# Patient Record
Sex: MALE | URBAN - METROPOLITAN AREA
[De-identification: names, ages, dates, MRNs, and addresses within clinical notes are randomized per-mention and may not be internally consistent; named-entity substitution may affect disease eponyms.]

---

## 2018-09-17 ENCOUNTER — TELEPHONE (OUTPATIENT)
Dept: FAMILY MEDICINE | Facility: CLINIC | Age: 38
End: 2018-09-17

## 2018-09-17 ENCOUNTER — OFFICE VISIT (OUTPATIENT)
Dept: FAMILY MEDICINE | Facility: CLINIC | Age: 38
End: 2018-09-17
Payer: COMMERCIAL

## 2018-09-17 VITALS
SYSTOLIC BLOOD PRESSURE: 131 MMHG | OXYGEN SATURATION: 97 % | DIASTOLIC BLOOD PRESSURE: 82 MMHG | TEMPERATURE: 98.3 F | HEART RATE: 81 BPM | RESPIRATION RATE: 16 BRPM | BODY MASS INDEX: 30.25 KG/M2 | WEIGHT: 188.2 LBS | HEIGHT: 66 IN

## 2018-09-17 DIAGNOSIS — Z91.030 BEE STING ALLERGY: ICD-10-CM

## 2018-09-17 DIAGNOSIS — F41.9 ANXIETY DISORDER, UNSPECIFIED TYPE: ICD-10-CM

## 2018-09-17 DIAGNOSIS — Z00.00 ROUTINE GENERAL MEDICAL EXAMINATION AT A HEALTH CARE FACILITY: Primary | ICD-10-CM

## 2018-09-17 RX ORDER — EPINEPHRINE 0.3 MG/.3ML
0.3 INJECTION SUBCUTANEOUS PRN
Qty: 0.6 ML | Refills: 0 | Status: SHIPPED | OUTPATIENT
Start: 2018-09-17

## 2018-09-17 ASSESSMENT — ANXIETY QUESTIONNAIRES
IF YOU CHECKED OFF ANY PROBLEMS ON THIS QUESTIONNAIRE, HOW DIFFICULT HAVE THESE PROBLEMS MADE IT FOR YOU TO DO YOUR WORK, TAKE CARE OF THINGS AT HOME, OR GET ALONG WITH OTHER PEOPLE: SOMEWHAT DIFFICULT
7. FEELING AFRAID AS IF SOMETHING AWFUL MIGHT HAPPEN: NOT AT ALL
1. FEELING NERVOUS, ANXIOUS, OR ON EDGE: MORE THAN HALF THE DAYS
6. BECOMING EASILY ANNOYED OR IRRITABLE: NOT AT ALL
5. BEING SO RESTLESS THAT IT IS HARD TO SIT STILL: SEVERAL DAYS
3. WORRYING TOO MUCH ABOUT DIFFERENT THINGS: MORE THAN HALF THE DAYS
GAD7 TOTAL SCORE: 7
2. NOT BEING ABLE TO STOP OR CONTROL WORRYING: SEVERAL DAYS

## 2018-09-17 ASSESSMENT — PATIENT HEALTH QUESTIONNAIRE - PHQ9: 5. POOR APPETITE OR OVEREATING: SEVERAL DAYS

## 2018-09-17 NOTE — PROGRESS NOTES
Male Physical Note          HPI         Concerns today: Anxiety    Patient presents today describing symptoms compatible with generalized anxiety. He/She confirms experiencing the following symptoms:Excessive worry, Tension, edginess, and irritability, Rapid heartbeat (palpitations), Shakes, chills, and hot flashes, Headaches and Muscle aches and back pain.  These symptoms are interfering with his/her ability to function.  Problems like this have been present in the past.    Patient current in treatment for substance abuse.  Finds the symptoms the worst when getting into group treatment, but are present all the other times.    Patient Active Problem List   Diagnosis     Anxiety disorder     Bee sting allergy       Past Medical History:   Diagnosis Date     Anxiety disorder      Bee sting allergy        Previous Medical Care   [facility]     Family History   Problem Relation Age of Onset     Diabetes Mother      Hypertension Father      Depression Father      Substance Abuse Father      Coronary Artery Disease No family hx of      Hyperlipidemia No family hx of      Cerebrovascular Disease No family hx of      Breast Cancer No family hx of      Colon Cancer No family hx of      Other Cancer No family hx of      Prostate Cancer No family hx of      Anxiety Disorder No family hx of      Mental Illness No family hx of      Anesthesia Reaction No family hx of      Asthma No family hx of      Osteoporosis No family hx of      Genetic Disorder No family hx of      Thyroid Disease No family hx of      Obesity No family hx of      Unknown/Adopted No family hx of               Review of Systems:     Review of Systems:  CONSTITUTIONAL: NEGATIVE for fever, chills, change in weight  INTEGUMENTARY/SKIN: NEGATIVE for worrisome rashes, moles or lesions  EYES: NEGATIVE for vision changes or irritation  ENT/MOUTH: NEGATIVE for ear, mouth and throat problems  RESP: NEGATIVE for significant cough or SOB  BREAST: NEGATIVE for masses,  "tenderness or discharge  CV: NEGATIVE for chest pain, palpitations or peripheral edema  GI: NEGATIVE for nausea, abdominal pain, heartburn, or change in bowel habits  : NEGATIVE for frequency, dysuria, or hematuria  MUSCULOSKELETAL: NEGATIVE for significant arthralgias or myalgia  NEURO: NEGATIVE for weakness, dizziness or paresthesias  ENDOCRINE: NEGATIVE for temperature intolerance, skin/hair changes  HEME/ALLERGY: NEGATIVE for bleeding problems  PSYCHIATRIC: NEGATIVE for changes in mood or affect  Sleep:   Do you snore most or the night (as reported by a family member)? No  Do you feel sleepy or extremely tired during most of the day? No             Social History     Social History     Social History     Marital status: Single     Spouse name: N/A     Number of children: 2     Years of education: N/A     Occupational History     Not on file.     Social History Main Topics     Smoking status: Current Every Day Smoker     Types: Cigarettes     Smokeless tobacco: Never Used     Alcohol use No      Comment: Previous abuse     Drug use: No      Comment: Previous meth and heroine - tried and did not persue it     Sexual activity: Not Currently     Partners: Female     Birth control/ protection: None     Other Topics Concern     Not on file     Social History Narrative     No narrative on file       Marital Status:Single  Who lives in your household? Myself, In treatment  Has anyone hurt you physically, for example by pushing, hitting, slapping or kicking you or forcing you to have sex? Denies  Do you feel threatened or controlled by a partner, ex-partner or anyone in your life? Denies    Sexual Health     Sexual concerns: No   STI History: Neg      Recommended Screening     Cholesterol Level (>44 yo or at risk):  Testing not indicated  HIV screening:  Recommended and patient declined testing.             Physical Exam:     Vitals: /82  Pulse 81  Temp 98.3  F (36.8  C) (Oral)  Resp 16  Ht 5' 6\" (167.6 cm)  " Wt 188 lb 3.2 oz (85.4 kg)  SpO2 97%  BMI 30.38 kg/m2  BMI= Body mass index is 30.38 kg/(m^2).  GENERAL: healthy, alert and no distress  EYES: Eyes grossly normal to inspection, extraocular movements - intact, and PERRL  HENT: ear canals- normal; TMs- normal; Nose- normal; Mouth- no ulcers, no lesions  NECK: no tenderness, no adenopathy, no asymmetry, no masses, no stiffness; thyroid- normal to palpation  RESP: lungs clear to auscultation - no rales, no rhonchi, no wheezes  BREAST: no masses, no tenderness, no nipple discharge, no palpable axillary masses or adenopathy  CV: regular rates and rhythm, normal S1 S2, no S3 or S4 and no murmur, no click or rub -  ABDOMEN: soft, no tenderness, no  hepatosplenomegaly, no masses, normal bowel sounds  MS: extremities- no gross deformities noted, no edema  SKIN: no suspicious lesions, no rashes  NEURO: strength and tone- normal, sensory exam- grossly normal, mentation- intact, speech- normal, reflexes- symmetric  BACK: no CVA tenderness, no paralumbar tenderness  - male: testicles- normal, no atrophy, no masses;  no inguinal hernias  RECTAL- male: no masses, no hemorhoids, Prostate- symmetric, no  nodularity, no masses, no hypertrophy  PSYCH: Alert and oriented times 3; speech- coherent , normal rate and volume; able to articulate logical thoughts, able to abstract reason, no tangential thoughts, no hallucinations or delusions, affect- normal  LYMPHATICS: ant. cervical- normal, post. cervical- normal, axillary- normal, supraclavicular- normal, inguinal- normal    Assessment and Plan      Alfredo was seen today for new patient and mental health problem.    Diagnoses and all orders for this visit:    Routine general medical examination at a health care facility  Patient reports health maintenance up to date.  Declines TDAP today despite no records of immunization.  Declines HIV testing.    Anxiety disorder, unspecified type  -     Start sertraline (ZOLOFT) 50 MG tablet;  Take 1 tablet (50 mg) by mouth daily  -     BEHAVIORAL HEALTH REFERRAL ([facility] interal and external).  Patient seen by behavioral health today.  -      Return to clinic in 4-6 weeks for recheck.    Bee sting allergy  -     Refill EPINEPHrine (EPIPEN/ADRENACLICK/OR ANY BX GENERIC EQUIV) 0.3 MG/0.3ML injection 2-pack; Inject 0.3 mLs (0.3 mg) into the muscle as needed for anaphylaxis      Options for treatment and follow-up care were reviewed with the patient. Alfredo Melgar and/or guardian engaged in the decision making process and verbalized understanding of the options discussed and agreed with the final plan.    Konrad Garzon MD

## 2018-09-17 NOTE — MR AVS SNAPSHOT
After Visit Summary   9/17/2018    Alfredo Melgar    MRN: [id number]           Patient Information     Date Of Birth          1980        Visit Information        Provider Department      9/17/2018 1:40 PM Konrad Garzon MD [facility]        Today's Diagnoses     Routine general medical examination at a health care facility    -  1    Anxiety disorder, unspecified type        Bee sting allergy          Care Instructions    Here is the plan from today's visit    Mental Health Appointment scheduled with Dr. Helm on Wednesday 9/26 at 1:20 PM    1. Routine general medical examination at a health care facility  Preventive Health Recommendations  Male Ages 26 - 39    Yearly exam:             See your health care provider every year in order to  o   Review health changes.   o   Discuss preventive care.    o   Review your medicines if your doctor has prescribed any.    You should be tested each year for STDs (sexually transmitted diseases), if you re at risk.     After age 35, talk to your provider about cholesterol testing. If you are at risk for heart disease, have your cholesterol tested at least every 5 years.     If you are at risk for diabetes, you should have a diabetes test (fasting glucose).  Shots: Get a flu shot each year. Get a tetanus shot every 10 years.     Nutrition:    Eat at least 5 servings of fruits and vegetables daily.     Eat whole-grain bread, whole-wheat pasta and brown rice instead of white grains and rice.     Get adequate Calcium and Vitamin D.     Lifestyle    Exercise for at least 150 minutes a week (30 minutes a day, 5 days a week). This will help you control your weight and prevent disease.     Limit alcohol to one drink per day.     No smoking.     Wear sunscreen to prevent skin cancer.     See your dentist every six months for an exam and cleaning.       2. Anxiety disorder, unspecified type  Start the medication as below  - sertraline (ZOLOFT)  50 MG tablet; Take 1 tablet (50 mg) by mouth daily  Dispense: 30 tablet; Refill: 1  - BEHAVIORAL HEALTH REFERRAL ([facility] interal and external)    3. Bee sting allergy  - EPINEPHrine (EPIPEN/ADRENACLICK/OR ANY BX GENERIC EQUIV) 0.3 MG/0.3ML injection 2-pack; Inject 0.3 mLs (0.3 mg) into the muscle as needed for anaphylaxis  Dispense: 0.6 mL; Refill: 0    Follow up plan  Please make a clinic appointment for follow up with me (JANIE GARZON) in 4-6  weeks for recheck anxiety.    Thank you for coming to [facility] today.  Lab Testing:  **If you had lab testing today and your results are reassuring or normal they will be mailed to you or sent through [facility] [employer] within 7 days.   **If the lab tests need quick action we will call you with the results.  The phone number we will call with results is # [phone] (home) . If this is not the best number please call our clinic and change the number.  Medication Refills:  If you need any refills please call your pharmacy and they will contact us.   If you need to  your refill at a new pharmacy, please contact the new pharmacy directly. The new pharmacy will help you get your medications transferred faster.   Scheduling:  If you have any concerns about today's visit or wish to schedule another appointment please call our office during normal business hours [phone] (8-5:00 M-F)  If a referral was made to a [facility] and you don't get a call from central scheduling please call [phone].  If a Mammogram was ordered for you at [facility] call [phone] to schedule or change your appointment.  If you had an XRay/CT/Ultrasound/MRI ordered the number is [phone] to schedule or change your radiology appointment.   Medical Concerns:  If you have urgent medical concerns please call [phone] at any time of the day.    Janie Garzon MD               Follow-ups after your visit        Additional Services     BEHAVIORAL  "HEALTH REFERRAL ([facility] interal and external)       Referring MD: JANIE GARZON    May leave message on voicemail: Yes  PHQ-9 Score: 3  GAD7 Score: 7                  Who to contact     Please call your clinic at [phone] to:    Ask questions about your health    Make or cancel appointments    Discuss your medicines    Learn about your test results    Speak to your doctor            Additional Information About Your Visit        Care EveryWhere ID     This is your Care EveryWhere ID. This could be used by other organizations to access your [facility] medical records  [id number]        Your Vitals Were     Pulse Temperature Respirations Height Pulse Oximetry BMI (Body Mass Index)    81 98.3  F (36.8  C) (Oral) 16 5' 6\" (167.6 cm) 97% 30.38 kg/m2       Blood Pressure from Last 3 Encounters:   09/17/18 131/82    Weight from Last 3 Encounters:   09/17/18 188 lb 3.2 oz (85.4 kg)              We Performed the Following     BEHAVIORAL HEALTH REFERRAL ([facility] inter[facility] and external)          Today's Medication Changes          These changes are accurate as of 9/17/18  2:41 PM.  If you have any questions, ask your nurse or doctor.               Start taking these medicines.        Dose/Directions    EPINEPHrine 0.3 MG/0.3ML injection 2-pack   Commonly known as:  EPIPEN/ADRENACLICK/or ANY BX GENERIC EQUIV   Used for:  Bee sting allergy   Started by:  Janie Garzon MD        Dose:  0.3 mg   Inject 0.3 mLs (0.3 mg) into the muscle as needed for anaphylaxis   Quantity:  0.6 mL   Refills:  0       sertraline 50 MG tablet   Commonly known as:  ZOLOFT   Used for:  Anxiety disorder, unspecified type   Started by:  Janie Garzon MD        Dose:  50 mg   Take 1 tablet (50 mg) by mouth daily   Quantity:  30 tablet   Refills:  1            Where to get your medicines      These medications were sent to [facility] [place] [facility] - [place], [place] - [address]  [address], [facility], [place] [address] "     Phone:  [phone]     EPINEPHrine 0.3 MG/0.3ML injection 2-pack    sertraline 50 MG tablet                Primary Care Provider Fax #    Physician No Ref-Primary [phone]       No address on file        Equal Access to Services     VAIBHAVRENETTA KATHY : Elfego arik whittington geovany Prince, washareeda luqadaha, qaybta kaalmada adeautumn, abena aguilar laPapivane warren. So [facility] [phone].    ATENCIÓN: Si habla español, tiene a pedro disposición servicios gratuitos de asistencia lingüística. Llame al [phone].    We comply with applicable federal civil rights laws and [place] laws. We do not discriminate on the basis of race, color, national origin, age, disability, sex, sexual orientation, or gender identity.            Thank you!     Thank you for choosing [facility]  for your care. Our goal is always to provide you with excellent care. Hearing back from our patients is one way we can continue to improve our services. Please take a few minutes to complete the written survey that you may receive in the mail after your visit with us. Thank you!             Your Updated Medication List - Protect others around you: Learn how to safely use, store and throw away your medicines at www.disposemymeds.org.          This list is accurate as of 9/17/18  2:41 PM.  Always use your most recent med list.                   Brand Name Dispense Instructions for use Diagnosis    EPINEPHrine 0.3 MG/0.3ML injection 2-pack    EPIPEN/ADRENACLICK/or ANY BX GENERIC EQUIV    0.6 mL    Inject 0.3 mLs (0.3 mg) into the muscle as needed for anaphylaxis    Bee sting allergy       sertraline 50 MG tablet    ZOLOFT    30 tablet    Take 1 tablet (50 mg) by mouth daily    Anxiety disorder, unspecified type

## 2018-09-17 NOTE — PROGRESS NOTES
Consult:  Was asked by Dr. Garzon to assist with getting this patient scheduled today for therapy for anxiety.  Patient is at [facility] and it can be difficult to reach patients when they are there.  Alfredo is scheduled with Dr. Helm for 9/20 at 1:20 PM.

## 2018-09-17 NOTE — TELEPHONE ENCOUNTER
Mental Health Referral:  Please schedule with Dr. Helm.      If you are unable to reach the patient after two phone attempts, please send a letter and close the encounter.      Thank you!

## 2018-09-17 NOTE — PATIENT INSTRUCTIONS
Here is the plan from today's visit    Mental Health Appointment scheduled with Dr. Helm on Wednesday 9/26 at 1:20 PM    1. Routine general medical examination at a health care facility  Preventive Health Recommendations  Male Ages 26 - 39    Yearly exam:             See your health care provider every year in order to  o   Review health changes.   o   Discuss preventive care.    o   Review your medicines if your doctor has prescribed any.    You should be tested each year for STDs (sexually transmitted diseases), if you re at risk.     After age 35, talk to your provider about cholesterol testing. If you are at risk for heart disease, have your cholesterol tested at least every 5 years.     If you are at risk for diabetes, you should have a diabetes test (fasting glucose).  Shots: Get a flu shot each year. Get a tetanus shot every 10 years.     Nutrition:    Eat at least 5 servings of fruits and vegetables daily.     Eat whole-grain bread, whole-wheat pasta and brown rice instead of white grains and rice.     Get adequate Calcium and Vitamin D.     Lifestyle    Exercise for at least 150 minutes a week (30 minutes a day, 5 days a week). This will help you control your weight and prevent disease.     Limit alcohol to one drink per day.     No smoking.     Wear sunscreen to prevent skin cancer.     See your dentist every six months for an exam and cleaning.       2. Anxiety disorder, unspecified type  Start the medication as below  - sertraline (ZOLOFT) 50 MG tablet; Take 1 tablet (50 mg) by mouth daily  Dispense: 30 tablet; Refill: 1  - BEHAVIORAL HEALTH REFERRAL ([facility] interal and external)    3. Bee sting allergy  - EPINEPHrine (EPIPEN/ADRENACLICK/OR ANY BX GENERIC EQUIV) 0.3 MG/0.3ML injection 2-pack; Inject 0.3 mLs (0.3 mg) into the muscle as needed for anaphylaxis  Dispense: 0.6 mL; Refill: 0    Follow up plan  Please make a clinic appointment for follow up with me (JANIE FERNANDO) in 4-6  weeks for  recheck anxiety.    Thank you for coming to [facility] today.  Lab Testing:  **If you had lab testing today and your results are reassuring or normal they will be mailed to you or sent through [employer] within 7 days.   **If the lab tests need quick action we will call you with the results.  The phone number we will call with results is # [phone] (home) . If this is not the best number please call our clinic and change the number.  Medication Refills:  If you need any refills please call your pharmacy and they will contact us.   If you need to  your refill at a new pharmacy, please contact the new pharmacy directly. The new pharmacy will help you get your medications transferred faster.   Scheduling:  If you have any concerns about today's visit or wish to schedule another appointment please call our office during normal business hours [phone] (8-5:00 M-F)  If a referral was made to a [facility] and you don't get a call from central scheduling please call [phone].  If a Mammogram was ordered for you at [facility] call [phone] to schedule or change your appointment.  If you had an XRay/CT/Ultrasound/MRI ordered the number is [phone] to schedule or change your radiology appointment.   Medical Concerns:  If you have urgent medical concerns please call [phone] at any time of the day.    Konrad Garzon MD

## 2018-09-18 ASSESSMENT — ANXIETY QUESTIONNAIRES: GAD7 TOTAL SCORE: 7

## 2018-09-18 ASSESSMENT — PATIENT HEALTH QUESTIONNAIRE - PHQ9: SUM OF ALL RESPONSES TO PHQ QUESTIONS 1-9: 3

## 2018-09-28 ENCOUNTER — TELEPHONE (OUTPATIENT)
Dept: FAMILY MEDICINE | Facility: CLINIC | Age: 38
End: 2018-09-28

## 2018-09-28 NOTE — TELEPHONE ENCOUNTER
This provider placed an outreach call to the patient as he did not show for his scheduled appointment on Wednesday 9/26/2018. Only number on file appears to be [facility] general number. Left a discrete message requesting the patient call the clinic and re-schedule if he is interested. One additional outreach call will be placed by this provider. If that attempt is unsuccessful, a letter will be sent. Following that letter, no additional outreach attempts will be made unless contacted by another referring provider.     Mary Helm, PhD  Behavioral Health Fellow

## 2018-10-03 NOTE — TELEPHONE ENCOUNTER
This provider placed a second outreach call to the patient as she did not show for her scheduled appointment last week on 9/26/18. Left a message with Jarret molina [facility] requesting the patient call the clinic and re-schedule if he is interested. If there is no word from patient within one week,  a letter will be sent. Following that letter, no additional outreach attempts will be made unless contacted by another referring provider.

## 2022-04-28 ENCOUNTER — TELEPHONE (OUTPATIENT)
Dept: UROLOGY | Facility: CLINIC | Age: 42
End: 2022-04-28